# Patient Record
Sex: FEMALE | Race: WHITE | NOT HISPANIC OR LATINO | URBAN - METROPOLITAN AREA
[De-identification: names, ages, dates, MRNs, and addresses within clinical notes are randomized per-mention and may not be internally consistent; named-entity substitution may affect disease eponyms.]

---

## 2020-08-26 ENCOUNTER — OFFICE VISIT (OUTPATIENT)
Dept: URBAN - METROPOLITAN AREA TELEHEALTH 2 | Facility: TELEHEALTH | Age: 65
End: 2020-08-26

## 2020-09-17 ENCOUNTER — OFFICE VISIT (OUTPATIENT)
Dept: URBAN - METROPOLITAN AREA CLINIC 118 | Facility: CLINIC | Age: 65
End: 2020-09-17

## 2020-11-10 ENCOUNTER — OFFICE VISIT (OUTPATIENT)
Dept: URBAN - METROPOLITAN AREA CLINIC 118 | Facility: CLINIC | Age: 65
End: 2020-11-10

## 2023-09-13 ENCOUNTER — LAB OUTSIDE AN ENCOUNTER (OUTPATIENT)
Dept: URBAN - METROPOLITAN AREA CLINIC 118 | Facility: CLINIC | Age: 68
End: 2023-09-13

## 2023-09-13 ENCOUNTER — OFFICE VISIT (OUTPATIENT)
Dept: URBAN - METROPOLITAN AREA CLINIC 118 | Facility: CLINIC | Age: 68
End: 2023-09-13
Payer: MEDICARE

## 2023-09-13 VITALS
HEART RATE: 71 BPM | BODY MASS INDEX: 34.38 KG/M2 | HEIGHT: 63 IN | WEIGHT: 194 LBS | SYSTOLIC BLOOD PRESSURE: 145 MMHG | TEMPERATURE: 97.8 F | DIASTOLIC BLOOD PRESSURE: 72 MMHG

## 2023-09-13 DIAGNOSIS — R10.13 EPIGASTRIC ABDOMINAL PAIN: ICD-10-CM

## 2023-09-13 DIAGNOSIS — K21.9 GERD WITHOUT ESOPHAGITIS: ICD-10-CM

## 2023-09-13 PROBLEM — 266435005: Status: ACTIVE | Noted: 2023-09-13

## 2023-09-13 PROCEDURE — 99214 OFFICE O/P EST MOD 30 MIN: CPT | Performed by: INTERNAL MEDICINE

## 2023-09-13 RX ORDER — OMEPRAZOLE 20 MG/1
TAKE 1 CAPSULE (20 MG) BY ORAL ROUTE ONCE DAILY BEFORE A MEAL FOR 30 DAYS CAPSULE, DELAYED RELEASE ORAL 1
Qty: 30 | Refills: 7 | Status: DISCONTINUED | COMMUNITY
Start: 2017-08-08

## 2023-09-13 RX ORDER — PREDNISONE 50 MG/1
1 TABLET TABLET ORAL
Qty: 3 | Refills: 0 | OUTPATIENT
Start: 2023-09-13 | End: 2023-09-14

## 2023-09-13 RX ORDER — POTASSIUM CHLORIDE 1.5 G/1
POWDER, FOR SOLUTION ORAL
Qty: 0 | Refills: 0 | Status: DISCONTINUED | COMMUNITY
Start: 1900-01-01

## 2023-09-13 NOTE — HPI-TODAY'S VISIT:
pt presents for GI evaluation. pt reports h/o gerd with recent mid/epigastric pain with or without eating. pt also noted worsening gerd. Noted nausea, denies vomiting or dysphagia. pt denies recent diet or med changes. No nsaids/asa. pt notes father  from pancreatic cancer and concerned for this. pt also notes constipation with some abdominal cramping. no recent weight loss or anemia. pt reprots on meds for gerd and reflux but unclear as to what taking. pt concerned and with recent mid abdominal pain would like further GI evaluation.

## 2023-11-22 ENCOUNTER — OFFICE VISIT (OUTPATIENT)
Dept: URBAN - METROPOLITAN AREA CLINIC 118 | Facility: CLINIC | Age: 68
End: 2023-11-22
Payer: MEDICARE

## 2023-11-22 ENCOUNTER — DASHBOARD ENCOUNTERS (OUTPATIENT)
Age: 68
End: 2023-11-22

## 2023-11-22 VITALS
BODY MASS INDEX: 32.96 KG/M2 | HEIGHT: 63 IN | HEART RATE: 75 BPM | DIASTOLIC BLOOD PRESSURE: 65 MMHG | TEMPERATURE: 97.5 F | SYSTOLIC BLOOD PRESSURE: 135 MMHG | WEIGHT: 186 LBS

## 2023-11-22 DIAGNOSIS — K21.9 GERD WITHOUT ESOPHAGITIS: ICD-10-CM

## 2023-11-22 DIAGNOSIS — R10.13 EPIGASTRIC ABDOMINAL PAIN: ICD-10-CM

## 2023-11-22 PROCEDURE — 99214 OFFICE O/P EST MOD 30 MIN: CPT | Performed by: INTERNAL MEDICINE

## 2023-11-22 RX ORDER — ONDANSETRON 4 MG/1
1 TABLET ON THE TONGUE AND ALLOW TO DISSOLVE TABLET, ORALLY DISINTEGRATING ORAL ONCE A DAY
Qty: 60 | Refills: 0 | OUTPATIENT
Start: 2023-11-22

## 2023-11-22 RX ORDER — SUCRALFATE 1 G/1
1 TABLET ON AN EMPTY STOMACH TABLET ORAL THREE TIMES A DAY
Qty: 90 TABLET | Refills: 3 | OUTPATIENT
Start: 2023-11-22 | End: 2024-03-21

## 2023-11-22 NOTE — HPI-TODAY'S VISIT:
Dr march pt seen 2 months ago, ordered CT, EGD, and prednisone 50mg to pre-treat for the IV ctrast due to allergy she reports just getting worse last month: 13 lb weight loss epi abd pain worse with eating but there all the time burning quality  food hangs up in the upper stomach/lower chest when eats duration 3 months and worsening  taking either pantoprazole or prilosec, 40mg, daily (she is alternating)  Colon 2019 neg 2019 EGD LA Grade A (one or more mucosal breaks less than 5 mm, not extending between tops of 2 mucosal folds) esophagitiswith no bleeding was found 35 to 36 cm from the incisors. Biopsies were taken with a cold forceps for histology.A 2 cm hiatal hernia was present.The exam of the esophagus was otherwise normal.Localized mild inflammation characterized by erosions and erythema was found in the gastric antrum. Biopsies weretaken with a cold forceps for histology.The exam of the stomach was otherwise normal.The examined duodenum was normal.  bx were neg

## 2023-12-06 ENCOUNTER — TELEPHONE ENCOUNTER (OUTPATIENT)
Dept: URBAN - METROPOLITAN AREA CLINIC 118 | Facility: CLINIC | Age: 68
End: 2023-12-06

## 2023-12-06 RX ORDER — DICYCLOMINE HYDROCHLORIDE 20 MG/1
1 TABLET TABLET ORAL
Qty: 90 | Refills: 5 | OUTPATIENT
Start: 2023-12-08 | End: 2024-06-05

## 2024-01-29 ENCOUNTER — OFFICE VISIT (OUTPATIENT)
Dept: URBAN - METROPOLITAN AREA MEDICAL CENTER 16 | Facility: MEDICAL CENTER | Age: 69
End: 2024-01-29
Payer: MEDICARE

## 2024-01-29 ENCOUNTER — TELEPHONE ENCOUNTER (OUTPATIENT)
Dept: URBAN - METROPOLITAN AREA CLINIC 118 | Facility: CLINIC | Age: 69
End: 2024-01-29

## 2024-01-29 DIAGNOSIS — K21.00 ALKALINE REFLUX ESOPHAGITIS: ICD-10-CM

## 2024-01-29 DIAGNOSIS — K29.60 ADENOPAPILLOMATOSIS GASTRICA: ICD-10-CM

## 2024-01-29 PROCEDURE — 43239 EGD BIOPSY SINGLE/MULTIPLE: CPT | Performed by: INTERNAL MEDICINE

## 2024-01-29 RX ORDER — ONDANSETRON 4 MG/1
1 TABLET ON THE TONGUE AND ALLOW TO DISSOLVE TABLET, ORALLY DISINTEGRATING ORAL ONCE A DAY
Qty: 60 | Refills: 0 | Status: ACTIVE | COMMUNITY
Start: 2023-11-22

## 2024-01-29 RX ORDER — SUCRALFATE 1 G/1
1 TABLET ON AN EMPTY STOMACH TABLET ORAL THREE TIMES A DAY
Qty: 90 TABLET | Refills: 3 | Status: ACTIVE | COMMUNITY
Start: 2023-11-22 | End: 2024-03-21

## 2024-01-29 RX ORDER — SUCRALFATE 1 G/1
1 TABLET ON AN EMPTY STOMACH TABLET ORAL
Qty: 120 TABLET | Refills: 5 | OUTPATIENT
Start: 2024-01-29 | End: 2024-07-27

## 2024-01-29 RX ORDER — DICYCLOMINE HYDROCHLORIDE 20 MG/1
1 TABLET TABLET ORAL
Qty: 90 | Refills: 5 | Status: ACTIVE | COMMUNITY
Start: 2023-12-08 | End: 2024-06-05

## 2024-04-10 ENCOUNTER — OV EP (OUTPATIENT)
Dept: URBAN - METROPOLITAN AREA CLINIC 118 | Facility: CLINIC | Age: 69
End: 2024-04-10

## 2024-06-11 ENCOUNTER — OFFICE VISIT (OUTPATIENT)
Dept: URBAN - METROPOLITAN AREA CLINIC 118 | Facility: CLINIC | Age: 69
End: 2024-06-11

## 2024-06-11 RX ORDER — ONDANSETRON 4 MG/1
1 TABLET ON THE TONGUE AND ALLOW TO DISSOLVE TABLET, ORALLY DISINTEGRATING ORAL ONCE A DAY
Qty: 60 | Refills: 0 | Status: ACTIVE | COMMUNITY
Start: 2023-11-22

## 2024-06-11 RX ORDER — SUCRALFATE 1 G/1
1 TABLET ON AN EMPTY STOMACH TABLET ORAL
Qty: 120 TABLET | Refills: 5 | Status: ACTIVE | COMMUNITY
Start: 2024-01-29 | End: 2024-07-27

## 2024-08-01 ENCOUNTER — ERX REFILL RESPONSE (OUTPATIENT)
Dept: URBAN - METROPOLITAN AREA CLINIC 118 | Facility: CLINIC | Age: 69
End: 2024-08-01

## 2024-08-01 RX ORDER — SUCRALFATE 1 G/1
TAKE 1 TABLET ON AN EMPTY STOMACH ORALLY 4 TIMES A DAY 30 DAY(S) TABLET ORAL
Qty: 360 TABLET | Refills: 1 | OUTPATIENT

## 2024-08-01 RX ORDER — SUCRALFATE 1 G/1
TAKE 1 TABLET ON AN EMPTY STOMACH ORALLY 4 TIMES A DAY 30 DAY(S) TABLET ORAL
Qty: 360 TABLET | Refills: 2 | OUTPATIENT

## 2024-08-14 ENCOUNTER — TELEPHONE ENCOUNTER (OUTPATIENT)
Dept: URBAN - METROPOLITAN AREA CLINIC 118 | Facility: CLINIC | Age: 69
End: 2024-08-14

## 2024-08-14 RX ORDER — DICYCLOMINE HYDROCHLORIDE 20 MG/1
1 TABLET TABLET ORAL
Qty: 90 | Refills: 5 | OUTPATIENT
Start: 2024-08-14 | End: 2025-02-09

## 2024-08-15 ENCOUNTER — OFFICE VISIT (OUTPATIENT)
Dept: URBAN - METROPOLITAN AREA CLINIC 118 | Facility: CLINIC | Age: 69
End: 2024-08-15